# Patient Record
Sex: FEMALE | Race: AMERICAN INDIAN OR ALASKA NATIVE
[De-identification: names, ages, dates, MRNs, and addresses within clinical notes are randomized per-mention and may not be internally consistent; named-entity substitution may affect disease eponyms.]

---

## 2017-05-01 ENCOUNTER — HOSPITAL ENCOUNTER (EMERGENCY)
Dept: HOSPITAL 14 - H.ER | Age: 52
Discharge: HOME | End: 2017-05-01
Payer: COMMERCIAL

## 2017-05-01 DIAGNOSIS — Z86.73: ICD-10-CM

## 2017-05-01 DIAGNOSIS — E11.22: ICD-10-CM

## 2017-05-01 DIAGNOSIS — Z79.82: ICD-10-CM

## 2017-05-01 DIAGNOSIS — E78.00: ICD-10-CM

## 2017-05-01 DIAGNOSIS — I12.9: ICD-10-CM

## 2017-05-01 DIAGNOSIS — Z95.5: ICD-10-CM

## 2017-05-01 DIAGNOSIS — Z79.84: ICD-10-CM

## 2017-05-01 DIAGNOSIS — I25.10: ICD-10-CM

## 2017-05-01 DIAGNOSIS — M75.51: Primary | ICD-10-CM

## 2017-05-01 NOTE — RAD
PROCEDURE:  Radiographs of the Right Shoulder



HISTORY:

pain







COMPARISON:

No prior.



FINDINGS:



BONES:

Normal. No fracture.



JOINTS:

Normal. Glenohumeral and acromioclavicular joints preserved. No 

osteoarthritis.



SOFT TISSUES:

NormalSoft tissue calcifications seen adjacent to the right humeral 

head suspicious for calcified tendinitis. .



OTHER FINDINGS:

None.



IMPRESSION:

Findings suspicious for calcified tendinitis.  Correlate clinically 

for possible rotator cuff tear.  If indicated further assessment by 

MRI may be obtained.

## 2017-05-01 NOTE — ED PDOC
Upper Extremity Pain/Injury


Time Seen by Provider: 05/01/17 08:00


Chief Complaint (Nursing): Upper Extremity Problem/Injury


Chief Complaint (Provider): Upper Extremity Problem/Injury


History Per: Patient


History/Exam Limitations: no limitations


Onset/Duration Of Symptoms: Days (x2 days)


Current Symptoms Are (Timing): Still Present


Additional Complaint(s): 





50 y/o female with past medical history of diabetes and hypertension presents 

to the emergency department with a complaint of right shoulder pain x2 days. 

Pain worsens with arm above horizontally. Denies trauma or weakness. 





Past Medical History


Reviewed: Historical Data, Nursing Documentation, Vital Signs





- Medical History


PMH: Anemia, CAD, CVA, Diabetes, HTN, Hypercholesterolemia, Hyperlipidemia, 

Hypothyroidism, Kidney Stones, Chronic Kidney Disease, TIA





- Surgical History


Surgical History: Coronary Stent (x 2)





- Family History


Family History: States: Unknown Family Hx





- Home Medications


Home Medications: 


 Ambulatory Orders











 Medication  Instructions  Recorded


 


Omega-3-Acid Ethyl Esters [Lovaza] 2 cap PO DAILY 08/27/14


 


Prednisolone [Prednisolone] 1 tab PO DAILY 08/27/14


 


Amlodipine Besylate [Norvasc] 5 mg PO DAILY 06/04/15


 


Aspirin [Aspirin EC] 81 mg PO DAILY 06/04/15


 


Atorvastatin [Lipitor] 40 mg PO DAILY 06/04/15


 


Carvedilol [Coreg] 25 mg PO BID 06/04/15


 


Ferrous Sulfate [Ferrous Sulfate] 325 mg PO DAILY 06/04/15


 


Glipizide [Glipizide] 1 tab PO DAILY 06/04/15


 


Levothyroxine Sodium [Synthroid] 1 tab PO DAILY 06/04/15


 


Linagliptin [Tradjenta] 1 tab PO DAILY 06/04/15


 


Metformin HCl [Metformin] 1,000 mg PO BID 06/04/15


 


Multivit,Iron,Min 5/Folic Acid 1 tab PO DAILY 06/04/15





[Strovite Forte]  


 


Pantoprazole Sodium [Protonix] 40 mg PO DAILY 06/04/15


 


Prasugrel [Effient] 1 tab PO DAILY 06/04/15


 


Sulfamethoxazole/Trimethoprim 1 tab PO BID #14 tab 03/09/16





[Bactrim  mg-160 mg]  


 


Naproxen [Naprosyn] 500 mg PO Q12H #20 tab 05/01/17














- Allergies


Allergies/Adverse Reactions: 


 Allergies











Allergy/AdvReac Type Severity Reaction Status Date / Time


 


No Known Allergies Allergy   Verified 03/09/16 09:05














Review of Systems


ROS Statement: Except As Marked, All Systems Reviewed And Found Negative


Constitutional: Negative for: Other (trauma)


Musculoskeletal: Positive for: Shoulder Pain (Right sided)


Neurological: Negative for: Weakness





Physical Exam





- Reviewed


Nursing Documentation Reviewed: Yes


Vital Signs Reviewed: Yes





- Physical Exam


Appears: Positive for: Well, Non-toxic, No Acute Distress


Head Exam: Positive for: ATRAUMATIC, NORMOCEPHALIC


Skin: Positive for: Normal Color, Warm, Dry


Extremity: Positive for: Tenderness (Tenderness of the right shoulder; anterior 

bursa. Pain elicited on raising arm above horizontally. ).  Negative for: 

Deformity


Neurologic/Psych: Positive for: Alert, Oriented.  Negative for: Motor/Sensory 

Deficits





Medical Decision Making


Medical Decision Making: 





Time: 8:00


Initial impression: Right shoulder pain


Initial plan:


--Shoulder Right (RAD)


--Revaluation














Scribe Attestation:


Documented by Elisa Servin, acting as a scribe for Ciro Anderson MD.





Provider Scribe Attestation:


All medical record entries made by the Scribe were at my direction and 

personally dictated by me. I have reviewed the chart and agree that the record 

accurately reflects my personal performance of the history, physical exam, 

medical decision making, and the department course for this patient. I have 

also personally directed, reviewed, and agree with the discharge instructions 

and disposition.





Disposition





- Clinical Impression


Clinical Impression: 


 Bursitis








- Patient ED Disposition


Is Patient to be Admitted: No


Counseled Patient/Family Regarding: Studies Performed, Diagnosis, Need For 

Followup, Rx Given





- Disposition


Referrals: 


Isai Loya MD [Family Provider] - 


Disposition: Routine/Home


Disposition Time: 10:07


Condition: FAIR


Prescriptions: 


Naproxen [Naprosyn] 500 mg PO Q12H #20 tab


Instructions:  Shoulder Bursitis (ED)

## 2017-11-15 ENCOUNTER — HOSPITAL ENCOUNTER (EMERGENCY)
Dept: HOSPITAL 14 - H.ER | Age: 52
Discharge: HOME | End: 2017-11-15
Payer: COMMERCIAL

## 2017-11-15 VITALS
OXYGEN SATURATION: 99 % | TEMPERATURE: 97 F | HEART RATE: 81 BPM | RESPIRATION RATE: 18 BRPM | SYSTOLIC BLOOD PRESSURE: 117 MMHG | DIASTOLIC BLOOD PRESSURE: 57 MMHG

## 2017-11-15 DIAGNOSIS — E11.22: ICD-10-CM

## 2017-11-15 DIAGNOSIS — Z79.82: ICD-10-CM

## 2017-11-15 DIAGNOSIS — Z79.84: ICD-10-CM

## 2017-11-15 DIAGNOSIS — E03.9: ICD-10-CM

## 2017-11-15 DIAGNOSIS — I12.9: ICD-10-CM

## 2017-11-15 DIAGNOSIS — N23: Primary | ICD-10-CM

## 2017-11-15 DIAGNOSIS — Z86.73: ICD-10-CM

## 2017-11-15 DIAGNOSIS — E78.00: ICD-10-CM

## 2017-11-15 DIAGNOSIS — Z95.5: ICD-10-CM

## 2017-11-15 LAB
ALBUMIN/GLOB SERPL: 1.3 {RATIO} (ref 1–2.1)
ALP SERPL-CCNC: 76 U/L (ref 38–126)
ALT SERPL-CCNC: 39 U/L (ref 9–52)
APTT BLD: 32.2 SECONDS (ref 25.6–37.1)
AST SERPL-CCNC: 27 U/L (ref 14–36)
BACTERIA #/AREA URNS HPF: (no result) /[HPF]
BASOPHILS # BLD AUTO: 0.1 K/UL (ref 0–0.2)
BASOPHILS NFR BLD: 0.7 % (ref 0–2)
BILIRUB SERPL-MCNC: 0.5 MG/DL (ref 0.2–1.3)
BILIRUB UR-MCNC: NEGATIVE MG/DL
BUN SERPL-MCNC: 16 MG/DL (ref 7–17)
CALCIUM SERPL-MCNC: 10.1 MG/DL (ref 8.4–10.2)
CHLORIDE SERPL-SCNC: 102 MMOL/L (ref 98–107)
CO2 SERPL-SCNC: 23 MMOL/L (ref 22–30)
COLOR UR: YELLOW
EOSINOPHIL # BLD AUTO: 0.1 K/UL (ref 0–0.7)
EOSINOPHIL NFR BLD: 1 % (ref 0–4)
ERYTHROCYTE [DISTWIDTH] IN BLOOD BY AUTOMATED COUNT: 14.9 % (ref 11.5–14.5)
GLOBULIN SER-MCNC: 3.9 GM/DL (ref 2.2–3.9)
GLUCOSE SERPL-MCNC: 136 MG/DL (ref 65–105)
GLUCOSE UR STRIP-MCNC: 50 MG/DL
HCT VFR BLD CALC: 34.5 % (ref 34–47)
KETONES UR STRIP-MCNC: NEGATIVE MG/DL
LEUKOCYTE ESTERASE UR-ACNC: (no result) LEU/UL
LYMPHOCYTES # BLD AUTO: 2.5 K/UL (ref 1–4.3)
LYMPHOCYTES NFR BLD AUTO: 33.3 % (ref 20–40)
MCH RBC QN AUTO: 26.5 PG (ref 27–31)
MCHC RBC AUTO-ENTMCNC: 32.9 G/DL (ref 33–37)
MCV RBC AUTO: 80.5 FL (ref 81–99)
MONOCYTES # BLD: 0.5 K/UL (ref 0–0.8)
MONOCYTES NFR BLD: 6 % (ref 0–10)
NEUTROPHILS # BLD: 4.5 K/UL (ref 1.8–7)
NEUTROPHILS NFR BLD AUTO: 59 % (ref 50–75)
NRBC BLD AUTO-RTO: 0.1 % (ref 0–0)
PH UR STRIP: 6 [PH] (ref 5–8)
PLATELET # BLD: 307 K/UL (ref 130–400)
PMV BLD AUTO: 7.7 FL (ref 7.2–11.7)
POTASSIUM SERPL-SCNC: 3.8 MMOL/L (ref 3.6–5)
PROT SERPL-MCNC: 8.7 G/DL (ref 6.3–8.2)
PROT UR STRIP-MCNC: 100 MG/DL
RBC # UR STRIP: (no result) /UL
RBC #/AREA URNS HPF: 1131 /HPF (ref 0–3)
SODIUM SERPL-SCNC: 140 MMOL/L (ref 132–148)
SP GR UR STRIP: 1.01 (ref 1–1.03)
UROBILINOGEN UR-MCNC: (no result) MG/DL (ref 0.2–1)
WBC # BLD AUTO: 7.6 K/UL (ref 4.8–10.8)
WBC #/AREA URNS HPF: 4 /HPF (ref 0–5)

## 2017-11-15 PROCEDURE — 80053 COMPREHEN METABOLIC PANEL: CPT

## 2017-11-15 PROCEDURE — 81025 URINE PREGNANCY TEST: CPT

## 2017-11-15 PROCEDURE — 99283 EMERGENCY DEPT VISIT LOW MDM: CPT

## 2017-11-15 PROCEDURE — 96365 THER/PROPH/DIAG IV INF INIT: CPT

## 2017-11-15 PROCEDURE — 85610 PROTHROMBIN TIME: CPT

## 2017-11-15 PROCEDURE — 74176 CT ABD & PELVIS W/O CONTRAST: CPT

## 2017-11-15 PROCEDURE — 76770 US EXAM ABDO BACK WALL COMP: CPT

## 2017-11-15 PROCEDURE — 85025 COMPLETE CBC W/AUTO DIFF WBC: CPT

## 2017-11-15 PROCEDURE — 96375 TX/PRO/DX INJ NEW DRUG ADDON: CPT

## 2017-11-15 PROCEDURE — 81003 URINALYSIS AUTO W/O SCOPE: CPT

## 2017-11-15 PROCEDURE — 85730 THROMBOPLASTIN TIME PARTIAL: CPT

## 2017-11-15 NOTE — US
PROCEDURE:  Ultrasound of the Kidneys



HISTORY:

stent removal yesterday, severe pain



COMPARISON:

CT abdomen and pelvis from earlier the same day.



TECHNIQUE:

Grayscale imaging was performed. 



FINDINGS:



RIGHT KIDNEY:

Measures: 11.0 cm. 



Normal in size, contour and echogenicity. 



There are 10 mm and 9 mm nonobstructing stones in the lower pole. No 

solid mass lesion or hydronephrosis visualized. There is a 1.1 x 1.3 

x 1.0 cm simple cyst in the upper pole.



LEFT KIDNEY:

Measures: 10.5  cm. 



Normal in size, contour and echogenicity. 



No stone, solid mass lesion or hydronephrosis visualized. 



OTHER FINDINGS:

None.



IMPRESSION:

10 mm and 9 mm nonobstructing stones in the lower pole of the right 

kidney. No hydronephrosis.



No left nephrolithiasis or hydronephrosis.

## 2017-11-15 NOTE — CT
PROCEDURE:  CT Abdomen and Pelvis without intravenous contrast



HISTORY:

r/o renal stone



COMPARISON:

09/22/2017



TECHNIQUE:

Unenhanced study. Neither oral nor intravenous contrast administered. 



Radiation dose:



Total exam DLP = 1040.76 mGy-cm.



This CT exam was performed using one or more of the following dose 

reduction techniques: Automated exposure control, adjustment of the 

mA and/or kV according to patient size, and/or use of iterative 

reconstruction technique.



FINDINGS:



LOWER THORAX:

Unremarkable. 



LIVER:

Unremarkable. No gross lesion or ductal dilatation.  



GALLBLADDER AND BILE DUCTS:

Unremarkable. 



PANCREAS:

Unremarkable. No gross lesion or ductal dilatation.



SPLEEN:

Unremarkable. 



ADRENALS:

Unremarkable. No mass. 



KIDNEYS AND URETERS:

Nonobstructing renal calculus disease primarily on the right. The 

staghorn type calculus identified on the prior study now appears in 

multiple smaller fragments the largest in the lower pole collecting 

system measures 7 x 10.5 mm. . Incidental less than 2 mm calculus 

lower pole collecting system left kidney. 



VASCULATURE:

Unremarkable. No aortic aneurysm. 



BOWEL:

Unremarkable. No obstruction. No gross mural thickening. 



APPENDIX:

Unremarkable. Normal appendix. 



PERITONEUM:

Unremarkable. No free fluid. No free air. 



LYMPH NODES:

Unremarkable. No enlarged lymph nodes. 



BLADDER:

Unremarkable. 



REPRODUCTIVE:

Unremarkable. 



BONES:

No acute fracture. 



OTHER FINDINGS:

None.



IMPRESSION:

Multiple primarily right-sided nonobstructing calculi none of which 

appear to be obstructing. The overall appearance suggests lithotripsy 

in fragments of the larger calculus identified on the prior study. 

Otherwise unremarkable study.

## 2017-11-15 NOTE — ED PDOC
HPI: Female  Pain


Time Seen by Provider: 11/15/17 11:47


Chief Complaint (Nursing): Female Genitourinary


Chief Complaint (Provider): Flank pain


History Per: Patient


Additional Complaint(s): 





51 yo female, PMH of CAD, HTN, Hypothyroid, High Cholesterol and DM, presents 

to ED with complaints of severe right sided flank pain that developed after she 

underwent stent removal in [facility] yesterday, by Dr. Wheatley. 


Pt denies any fever, nausea or vomiting. 





Past Medical History


Reviewed: Nursing Documentation, Vital Signs


Vital Signs: 





 Last Vital Signs











Temp  97.0 F L  11/15/17 11:57


 


Pulse  81   11/15/17 11:57


 


Resp  18   11/15/17 11:57


 


BP  117/57 L  11/15/17 11:57


 


Pulse Ox  99   11/15/17 11:57














- Medical History


PMH: Anemia, CAD, CVA, Diabetes, HTN, Hypercholesterolemia, Hyperlipidemia, 

Hypothyroidism, Kidney Stones, Chronic Kidney Disease, TIA





- Surgical History


Surgical History: Coronary Stent (x 2)





- Family History


Family History: States: Unknown Family Hx





- Living Arrangements


Living Arrangements: With Family





- Social History


Current smoker - smoking cessation education provided: No


Alcohol: None


Drugs: Denies





- Home Medications


Home Medications: 


 Ambulatory Orders











 Medication  Instructions  Recorded


 


Omega-3-Acid Ethyl Esters [Lovaza] 2 cap PO DAILY 08/27/14


 


Prednisolone [Prednisolone] 1 tab PO DAILY 08/27/14


 


Amlodipine Besylate [Norvasc] 5 mg PO DAILY 06/04/15


 


Aspirin [Aspirin EC] 81 mg PO DAILY 06/04/15


 


Atorvastatin [Lipitor] 40 mg PO DAILY 06/04/15


 


Carvedilol [Coreg] 25 mg PO BID 06/04/15


 


Ferrous Sulfate [Ferrous Sulfate] 325 mg PO DAILY 06/04/15


 


Glipizide [Glipizide] 1 tab PO DAILY 06/04/15


 


Levothyroxine Sodium [Synthroid] 1 tab PO DAILY 06/04/15


 


Linagliptin [Tradjenta] 1 tab PO DAILY 06/04/15


 


Metformin HCl [Metformin] 1,000 mg PO BID 06/04/15


 


Multivit,Iron,Min 5/Folic Acid 1 tab PO DAILY 06/04/15





[Strovite Forte]  


 


Pantoprazole Sodium [Protonix] 40 mg PO DAILY 06/04/15


 


Prasugrel [Effient] 1 tab PO DAILY 06/04/15


 


Sulfamethoxazole/Trimethoprim 1 tab PO BID #14 tab 03/09/16





[Bactrim  mg-160 mg]  


 


Naproxen [Naprosyn] 500 mg PO Q12H #20 tab 05/01/17


 


Cephalexin [cephalexin] 500 mg PO BID #10 cap 11/15/17


 


traMADol [Ultram] 50 mg PO Q4 #10 tab 11/15/17














- Allergies


Allergies/Adverse Reactions: 


 Allergies











Allergy/AdvReac Type Severity Reaction Status Date / Time


 


No Known Allergies Allergy   Verified 03/09/16 09:05














Review of Systems


ROS Statement: Except As Marked, All Systems Reviewed And Found Negative


Gastrointestinal: Positive for: Abdominal Pain





Physical Exam





- Reviewed


Nursing Documentation Reviewed: Yes


Vital Signs Reviewed: Yes





- Physical Exam


Appears: Positive for: Non-toxic, No Acute Distress, Uncomfortable


Head Exam: Positive for: ATRAUMATIC, NORMAL INSPECTION, NORMOCEPHALIC


Skin: Positive for: Normal Color, Warm, DRY


Eye Exam: Positive for: EOMI, Normal appearance, PERRL


ENT: Positive for: Normal ENT Inspection


Neck: Positive for: Normal, Painless ROM


Cardiovascular/Chest: Positive for: Regular Rate, Rhythm


Respiratory: Positive for: CNT, Normal Breath Sounds


Gastrointestinal/Abdominal: Positive for: Bowel Sounds, Soft, Tenderness


Back: Positive for: Normal Inspection, R CVA Tenderness


Extremity: Positive for: Normal ROM


Neurologic/Psych: Positive for: Alert, Oriented





- Laboratory Results


Result Diagrams: 


 11/15/17 13:15





 11/15/17 13:15





- ECG


O2 Sat by Pulse Oximetry: 99





Medical Decision Making


Medical Decision Making: 





IV access established and treatment initiated with IVF, Ancef and Toradol





Labs resulted and reviewed with Pt who demonstrated full understanding. Pt 

reports feeling improved on re-eval








IMPRESSION:


Multiple primarily right-sided nonobstructing calculi none of which appear to 

be obstructing. The overall appearance suggests lithotripsy in fragments of the 

larger calculus identified on the prior study. Otherwise unremarkable study.








Case discussed with Pt's Urologist, Dr. Wheatley [phone], who agrees Pt 

stable for discharge, will follow up in office  as scheduled. 





Disposition





- Clinical Impression


Clinical Impression: 


 Renal colic








- Patient ED Disposition


Is Patient to be Admitted: No





- Disposition


Disposition: Routine/Home


Disposition Time: 16:16


Condition: STABLE


Prescriptions: 


Cephalexin [cephalexin] 500 mg PO BID #10 cap


traMADol [Ultram] 50 mg PO Q4 #10 tab


Instructions:  Renal Colic (ED)


Forms:  [facility] (English)

## 2018-05-10 ENCOUNTER — HOSPITAL ENCOUNTER (OUTPATIENT)
Dept: HOSPITAL 31 - C.ENDO | Age: 53
Discharge: HOME | End: 2018-05-10
Attending: SPECIALIST
Payer: COMMERCIAL

## 2018-05-10 VITALS — OXYGEN SATURATION: 100 %

## 2018-05-10 VITALS — SYSTOLIC BLOOD PRESSURE: 135 MMHG | DIASTOLIC BLOOD PRESSURE: 77 MMHG | RESPIRATION RATE: 12 BRPM | HEART RATE: 66 BPM

## 2018-05-10 VITALS — TEMPERATURE: 98.1 F

## 2018-05-10 DIAGNOSIS — I25.10: ICD-10-CM

## 2018-05-10 DIAGNOSIS — E11.9: ICD-10-CM

## 2018-05-10 DIAGNOSIS — K29.70: Primary | ICD-10-CM

## 2018-05-10 DIAGNOSIS — K44.9: ICD-10-CM

## 2018-05-10 DIAGNOSIS — I10: ICD-10-CM

## 2018-05-10 DIAGNOSIS — K21.0: ICD-10-CM

## 2018-05-10 DIAGNOSIS — K30: ICD-10-CM

## 2018-05-10 PROCEDURE — 43239 EGD BIOPSY SINGLE/MULTIPLE: CPT

## 2018-05-10 PROCEDURE — 88305 TISSUE EXAM BY PATHOLOGIST: CPT

## 2018-05-10 PROCEDURE — 82948 REAGENT STRIP/BLOOD GLUCOSE: CPT

## 2018-05-10 PROCEDURE — 88342 IMHCHEM/IMCYTCHM 1ST ANTB: CPT

## 2018-05-10 NOTE — CP.SDSHP
Same Day Surgery H & P





- History


Proposed Procedure: EGD


Pre-Op Diagnosis: SEE NOTES





- Previous Medical/Surgical History


Cardiac: Hypertension, ASHD/CAD


Endocrine/Metabolic: Thyroid Disease, Diabetes, Other


Misc: Other


Pain: 4.Moderate Pain





- Allergies


Allergies: 


Allergies





No Known Allergies Allergy (Verified 05/10/18 07:17)


 











- Physical Exam


General Appearance: N


Vital Signs: 


 Vital Signs











  05/10/18





  07:10


 


Temperature 97.1 F L


 


Pulse Rate 57 L


 


Respiratory 19





Rate 


 


Blood Pressure 134/72


 


O2 Sat by Pulse 100





Oximetry 











Mental Status: Alert & Oriented x3


Neuro: WNL


Heart: Other


Lungs: WNL


GI: Other





- {Optional Preform as Required}


Breast: WNL


Abdomen: Other


Rectal: Other


Integument: WNL


: WNL


Ortho: Other


ENT: WNL





- Impression


Pt. Evaluated Today:Candidate for Anesthesia & Procedure: Yes





- Date & Time


Time: 08:20





Short Stay Discharge





- Short Stay Discharge


Admitting Diagnosis/Reason for Visit: DYSPEPSIA, ABDOMINAL PAIN


Disposition: HOME/ ROUTINE


Referrals: 


Олег Umaña MD [Primary Care Provider] -